# Patient Record
Sex: MALE | Race: WHITE | URBAN - METROPOLITAN AREA
[De-identification: names, ages, dates, MRNs, and addresses within clinical notes are randomized per-mention and may not be internally consistent; named-entity substitution may affect disease eponyms.]

---

## 2017-09-12 ENCOUNTER — HOSPITAL ENCOUNTER (OUTPATIENT)
Dept: MEDSURG UNIT | Facility: HOSPITAL | Age: 32
End: 2017-09-13
Attending: INTERNAL MEDICINE | Admitting: INTERNAL MEDICINE

## 2017-09-12 LAB
ABS NEUT (OLG): 8.3 X10(3)/MCL (ref 1.5–6.9)
ALBUMIN SERPL-MCNC: 3.9 GM/DL (ref 3.4–5)
ALBUMIN/GLOB SERPL: 1.1 RATIO
ALP SERPL-CCNC: 61 UNIT/L (ref 30–113)
ALT SERPL-CCNC: 32 UNIT/L (ref 10–45)
AMPHET UR QL SCN: NORMAL
APPEARANCE, UA: CLEAR
APTT PPP: 35.2 SECOND(S) (ref 25–35)
AST SERPL-CCNC: 25 UNIT/L (ref 15–37)
BARBITURATE SCN PRESENT UR: NORMAL
BASOPHILS # BLD AUTO: 0 X10(3)/MCL (ref 0–0.1)
BASOPHILS NFR BLD AUTO: 0 % (ref 0–1)
BENZODIAZ UR QL SCN: NORMAL
BILIRUB SERPL-MCNC: 0.2 MG/DL (ref 0.1–0.9)
BILIRUB UR QL STRIP: NEGATIVE
BILIRUBIN DIRECT+TOT PNL SERPL-MCNC: 0.1 MG/DL
BILIRUBIN DIRECT+TOT PNL SERPL-MCNC: 0.1 MG/DL (ref 0–0.3)
BUN SERPL-MCNC: 15 MG/DL (ref 10–20)
CALCIUM SERPL-MCNC: 9 MG/DL (ref 8–10.5)
CANNABINOIDS UR QL SCN: NORMAL
CHLORIDE SERPL-SCNC: 106 MMOL/L (ref 100–108)
CK MB SERPL-MCNC: 1.5 NG/ML (ref 0–3.6)
CK SERPL-CCNC: 200 UNIT/L (ref 39–225)
CO2 SERPL-SCNC: 25 MMOL/L (ref 21–35)
COCAINE UR QL SCN: NORMAL
COLOR UR: NORMAL
CREAT SERPL-MCNC: 1.13 MG/DL (ref 0.7–1.3)
EOSINOPHIL # BLD AUTO: 0.2 X10(3)/MCL (ref 0–0.6)
EOSINOPHIL NFR BLD AUTO: 1 % (ref 0–5)
ERYTHROCYTE [DISTWIDTH] IN BLOOD BY AUTOMATED COUNT: 12.6 % (ref 11.5–17)
GLOBULIN SER-MCNC: 3.7 GM/DL
GLUCOSE (UA): NEGATIVE
GLUCOSE SERPL-MCNC: 90 MG/DL (ref 75–116)
HCT VFR BLD AUTO: 42.3 % (ref 42–52)
HGB BLD-MCNC: 14.5 GM/DL (ref 14–18)
HGB UR QL STRIP: NEGATIVE
INR PPP: 1 (ref 0–1.2)
KETONES UR QL STRIP: NEGATIVE
LEUKOCYTE ESTERASE UR QL STRIP: NEGATIVE
LYMPHOCYTES # BLD AUTO: 4.3 X10(3)/MCL (ref 0.5–4.1)
LYMPHOCYTES NFR BLD AUTO: 30.8 % (ref 15–40)
MCH RBC QN AUTO: 33 PG (ref 27–34)
MCHC RBC AUTO-ENTMCNC: 34 GM/DL (ref 31–36)
MCV RBC AUTO: 96 FL (ref 80–99)
MDMA UR QL SCN: NORMAL
METHADONE UR QL SCN: NORMAL
MONOCYTES # BLD AUTO: 1 X10(3)/MCL (ref 0–1.1)
MONOCYTES NFR BLD AUTO: 7 % (ref 4–12)
NEUTROPHILS # BLD AUTO: 8.3 X10(3)/MCL (ref 1.5–6.9)
NEUTROPHILS NFR BLD AUTO: 60 % (ref 43–75)
NITRITE UR QL STRIP: NEGATIVE
OPIATES UR QL SCN: NORMAL
PCP UR QL: NORMAL
PH UR STRIP.AUTO: 5.5 [PH] (ref 5–8)
PH UR STRIP: 5.5 [PH]
PLATELET # BLD AUTO: 261 X10(3)/MCL (ref 140–400)
PMV BLD AUTO: 8.8 FL (ref 6.8–10)
POTASSIUM SERPL-SCNC: 3.8 MMOL/L (ref 3.6–5.2)
PROT SERPL-MCNC: 7.6 GM/DL (ref 6.4–8.2)
PROT UR QL STRIP: NEGATIVE
PROTHROMBIN TIME: 11.4 SECOND(S) (ref 9–12)
RBC # BLD AUTO: 4.39 X10(6)/MCL (ref 4.7–6.1)
SODIUM SERPL-SCNC: 139 MMOL/L (ref 135–145)
SP GR UR STRIP: 1.02
T4 FREE SERPL-MCNC: 1.04 NG/DL (ref 0.76–1.46)
TEMPERATURE, URINE (OHS): 22.5 DEGC (ref 20–25)
TROPONIN I SERPL-MCNC: <0.017 NG/ML (ref 0–0.06)
TSH SERPL-ACNC: 3.71 MIU/ML (ref 0.36–3.74)
UROBILINOGEN UR STRIP-ACNC: 0.2 EU/DL
WBC # SPEC AUTO: 13.9 X10(3)/MCL (ref 4.5–11.5)

## 2017-09-13 LAB
ABS NEUT (OLG): 5 X10(3)/MCL (ref 1.5–6.9)
ALBUMIN SERPL-MCNC: 3.2 GM/DL (ref 3.4–5)
ALBUMIN/GLOB SERPL: 0.9 RATIO
ALP SERPL-CCNC: 55 UNIT/L (ref 30–113)
ALT SERPL-CCNC: 28 UNIT/L (ref 10–45)
AST SERPL-CCNC: 22 UNIT/L (ref 15–37)
BASOPHILS # BLD AUTO: 0 X10(3)/MCL (ref 0–0.1)
BASOPHILS NFR BLD AUTO: 0 % (ref 0–1)
BILIRUB SERPL-MCNC: 0.2 MG/DL (ref 0.1–0.9)
BILIRUBIN DIRECT+TOT PNL SERPL-MCNC: 0.1 MG/DL
BILIRUBIN DIRECT+TOT PNL SERPL-MCNC: 0.1 MG/DL (ref 0–0.3)
BUN SERPL-MCNC: 12 MG/DL (ref 10–20)
CALCIUM SERPL-MCNC: 8.3 MG/DL (ref 8–10.5)
CHLORIDE SERPL-SCNC: 109 MMOL/L (ref 100–108)
CK MB SERPL-MCNC: 1 NG/ML (ref 0–3.6)
CK MB SERPL-MCNC: 1.3 NG/ML (ref 0–3.6)
CK SERPL-CCNC: 145 UNIT/L (ref 39–225)
CK SERPL-CCNC: 164 UNIT/L (ref 39–225)
CO2 SERPL-SCNC: 28 MMOL/L (ref 21–35)
CREAT SERPL-MCNC: 1.05 MG/DL (ref 0.7–1.3)
EOSINOPHIL # BLD AUTO: 0.3 X10(3)/MCL (ref 0–0.6)
EOSINOPHIL NFR BLD AUTO: 3 % (ref 0–5)
ERYTHROCYTE [DISTWIDTH] IN BLOOD BY AUTOMATED COUNT: 12.8 % (ref 11.5–17)
GLOBULIN SER-MCNC: 3.4 GM/DL
GLUCOSE SERPL-MCNC: 107 MG/DL (ref 75–116)
HCT VFR BLD AUTO: 41.3 % (ref 42–52)
HGB BLD-MCNC: 14.1 GM/DL (ref 14–18)
LYMPHOCYTES # BLD AUTO: 3.8 X10(3)/MCL (ref 0.5–4.1)
LYMPHOCYTES NFR BLD AUTO: 38.1 % (ref 15–40)
MCH RBC QN AUTO: 33 PG (ref 27–34)
MCHC RBC AUTO-ENTMCNC: 34 GM/DL (ref 31–36)
MCV RBC AUTO: 97 FL (ref 80–99)
MONOCYTES # BLD AUTO: 0.8 X10(3)/MCL (ref 0–1.1)
MONOCYTES NFR BLD AUTO: 8 % (ref 4–12)
NEUTROPHILS # BLD AUTO: 5 X10(3)/MCL (ref 1.5–6.9)
NEUTROPHILS NFR BLD AUTO: 50 % (ref 43–75)
PLATELET # BLD AUTO: 228 X10(3)/MCL (ref 140–400)
PMV BLD AUTO: 8.7 FL (ref 6.8–10)
POTASSIUM SERPL-SCNC: 4.2 MMOL/L (ref 3.6–5.2)
PROT SERPL-MCNC: 6.6 GM/DL (ref 6.4–8.2)
RBC # BLD AUTO: 4.26 X10(6)/MCL (ref 4.7–6.1)
SODIUM SERPL-SCNC: 144 MMOL/L (ref 135–145)
TROPONIN I SERPL-MCNC: <0.017 NG/ML (ref 0–0.06)
TROPONIN I SERPL-MCNC: <0.017 NG/ML (ref 0–0.06)
WBC # SPEC AUTO: 9.9 X10(3)/MCL (ref 4.5–11.5)

## 2022-04-30 NOTE — ED PROVIDER NOTES
Patient:   Antonio Frost             MRN: [id number]            FIN: [id number]               Age:   31 years     Sex:  Male     :  1985   Associated Diagnoses:   Palpitations; Atypical chest pain   Author:   Ml Munson MD      Basic Information   Time seen: Date & time 2017 17:30:00.   History source: Patient.   Arrival mode: Private vehicle.   History limitation: None.   Additional information: Chief Complaint from Nursing Triage Note : Chief Complaint   2017 17:24 CDT      Chief Complaint           Reports chest pain after episode of palpitations, PTA.  .      History of Present Illness   The patient presents with chest pain and Palpitations.  The onset was just prior to arrival.  The course/duration of symptoms is resolved: lasted 2 minute(s).  Location: substernal. Radiating pain: none. The character of symptoms is tightness.  The degree at onset was moderate.  The degree at maximum was moderate.  The degree at present is none.  The exacerbating factor is none.  The relieving factor is none.  Risk factors consist of family history of coronary artery disease.  Prior episodes: Palpitations in past.  Therapy today None.  Associated symptoms: palpitations.    According to patient he felt as if his heart is a stopped and it became tied behind his sternum, and he fell on his knees holding his chest and he punched himself and that his heart started again and he had chest pain which is getting better now..        Review of Systems   Constitutional symptoms:  No fever, no chills, no sweats.    Skin symptoms:  No jaundice, no rash.    Eye symptoms:  Negative except as documented in HPI.   ENMT symptoms:  No sore throat,    Respiratory symptoms:  No shortness of breath, no cough, no wheezing.    Cardiovascular symptoms:  Chest pain, central, moderate pain, tightness, palpitations, No tachycardia,    Gastrointestinal symptoms:  No abdominal pain, no nausea, no vomiting, no diarrhea, no  constipation.    Genitourinary symptoms:  No dysuria,    Musculoskeletal symptoms:  No back pain,    Neurologic symptoms:  No headache, no dizziness.    Psychiatric symptoms:  No anxiety, no depression, no substance abuse.    Allergy/immunologic symptoms:  No seasonal allergies,              Additional review of systems information: All other systems reviewed and otherwise negative.      Health Status   Allergies:    Allergic Reactions (Selected)  No Known Medication Allergies.   Medications: Per nurse's notes.      Past Medical/ Family/ Social History   Medical history: Negative.   Surgical history:    Herniorrhaphy ([id number]).  Cholecystectomy ([id number])., Reviewed as documented in chart.   Family history:    Father:  ()  Cause of Death: AMI  Age at Death: 46 years.   Acute myocardial infarction.  Mother  Coronary artery disease  Hypertension.  Diabetes mellitus type 2  Hyperlipidemia.  , Reviewed as documented in chart.   Social history:    Social & Psychosocial Habits    Alcohol  2017  Use: Current    Type: Beer    Frequency: 1-2 times per month    Tobacco  2017  Use: Heavy tobacco smoker    Type: Cigarettes    Tobacco use per day: 20  , Reviewed as documented in chart, Alcohol use: Occasionally, Tobacco use: Regularly, Drug use: Denies, Occupation: Employed, Family/social situation: .   Problem list:    Active Problems (1)  Tobacco user   , per nurse's notes.      Physical Examination               Vital Signs   Vital Signs   2017 17:24 CDT      Temperature Temporal Artery               36.8 DegC                             Peripheral Pulse Rate     101 bpm  HI                             Respiratory Rate          20 br/min                             SpO2                      98 %                             Oxygen Therapy            Room air                             Systolic Blood Pressure   153 mmHg  HI                             Diastolic Blood Pressure  103 mmHg  HI  .    Measurements   2017 17:24 CDT      Weight Dosing             125 kg                             Weight Measured and Calculated in Lbs     275.57 lb                             Weight Estimated          125 kg                             Height/Length Dosing      172 cm                             Height/Length Estimated   172 cm                             Body Mass Index Estimated 42.25 kg/m2  .   Oxygen saturation.   General:  Alert, no acute distress.    Skin:  Warm, dry.    Head:  Normocephalic.   Neck:  Supple.   Eye:  Extraocular movements are intact, No icterus.    Ears, nose, mouth and throat:  Oral mucosa moist, no pharyngeal erythema or exudate.    Cardiovascular:  Regular rate and rhythm, No murmur, Normal peripheral perfusion, No edema.    Respiratory:  Lungs are clear to auscultation, respirations are non-labored, breath sounds are equal.    Back:  Nontender, Normal range of motion.    Musculoskeletal:  Normal ROM.   Chest wall   Gastrointestinal:  Soft, Nontender, Non distended, Normal bowel sounds, No organomegaly.    Neurological:  Alert and oriented to person, place, time, and situation, No focal neurological deficit observed, CN II-XII intact, normal motor observed, normal speech observed.    Lymphatics   Psychiatric:  Cooperative, appropriate mood & affect.       Medical Decision Making   Differential Diagnosis:  Angina, Cardiac dysrhythmia.    Documents reviewed:  Emergency department nurses' notes.   Orders  Launch Order Profile (Selected)   Inpatient Orders  Ordered  Blood Glucose Monitoring POC: 17 17:26:00 CDT, Once-Unscheduled, 17 17:26:00 CDT  Blood Pressure: 17 17:27:00 CDT, Stop date 17 17:27:00 CDT, Monitor blood pressure.  Cardiac Monitorin17 17:27:00 CDT, Constant Order, Place on telemetry.  EKG Adult 12 Lead: 17 17:27:00 CDT, Stat, Chest Pain, 17 17:27:00 CDT, Show to provider upon completion., -1, -1, 17 17:27:00 CDT  Normal  Saline (0.9% NS) IV 1,000 mL: 1,000 mL, 1,000 mL, IV, 150 mL/hr, start date 09/12/17 17:37:00 CDT  Pulse Oximetry: 09/12/17 17:27:00 CDT, Stop date 09/12/17 17:27:00 CDT, Place on pulse oximetry.  Monitor oxygen saturation.  Saline Lock Insert: 09/12/17 17:37:00 CDT, Once-NOW, Stop date 09/12/17 17:37:00 CDT  aspirin 325 mg oral tablet: 325 mg, form: Tab, Oral, Once-NOW, first dose 09/12/17 17:37:00 CDT, stop date 09/12/17 17:37:00 CDT, 4 chew tab = 5 grain ASA, [id number]  Ordered (Exam Ordered)  XR Chest 1 View: Stat, 09/12/17 17:27:00 CDT, Hypertension, None, Stretcher, Rad Type, 09/12/17 17:27:00 CDT  Ordered (In-Lab)  BNP-Pro: Stat collect, 09/12/17 17:27:00 CDT, Blood, Once, Stop date 09/12/17 17:27:00 CDT, Lab Collect, 09/12/17 17:27:00 CDT  CK MB: Stat collect, 09/12/17 17:27:00 CDT, Blood, Stop date 09/12/17 17:27:00 CDT, Lab Collect, Print Label By Order Location, 09/12/17 17:27:00 CDT  CMP: Stat collect, 09/12/17 17:27:00 CDT, Blood, Stop date 09/12/17 17:27:00 CDT, Lab Collect, 09/12/17 17:27:00 CDT  CPK: Stat collect, 09/12/17 17:27:00 CDT, Blood, Stop date 09/12/17 17:27:00 CDT, Lab Collect, Print Label By Order Location, 09/12/17 17:27:00 CDT  PT (Protime): Stat collect, 09/12/17 17:27:00 CDT, Blood, Stop date 09/12/17 17:27:00 CDT, Lab Collect, 09/12/17 17:27:00 CDT  PTT: Stat collect, 09/12/17 17:27:00 CDT, Blood, Stop date 09/12/17 17:27:00 CDT, Lab Collect, Print Label By Order Location, 09/12/17 17:27:00 CDT  Troponin-I: Stat collect, 09/12/17 17:27:00 CDT, Blood, Stop date 09/12/17 17:27:00 CDT, Lab Collect, 09/12/17 17:27:00 CDT  Completed  Automated Diff: Stat collect, 09/12/17 17:34:00 CDT, Blood, Collected, Stop date 09/12/17 17:34:00 CDT, Lab Collect, 09/12/17 17:27:00 CDT  CBC w/ Auto Diff: Stat collect, 09/12/17 17:27:00 CDT, Blood, Stop date 09/12/17 17:27:00 CDT, Lab Collect, 09/12/17 17:27:00 CDT  POC CBG: Blood, Routine collect, Collected, 09/12/17 17:23:00 CDT  Discontinued  EKG:  09/12/17 17:26:00 CDT, Stat, Once, 24, 09/12/17 17:26:00 CDT, -1, -1, 09/12/17 17:26:00 CDT.   Results review:  Lab results : Lab View   9/12/2017 17:34 CDT      Sodium Lvl                139 mmol/L                             Potassium Lvl             3.8 mmol/L                             Chloride                  106 mmol/L                             CO2                       25 mmol/L                             Calcium Lvl               9.0 mg/dL                             Glucose Lvl               90 mg/dL                             BUN                       15 mg/dL                             Creatinine                1.13 mg/dL                             eGFR-AA                   >60 mL/min/1.73 m2  NA                             eGFR-AMY                  >60 mL/min/1.73 m2  NA                             Bili Total                0.2 mg/dL                             Bili Direct               0.10 mg/dL                             Bili Indirect             0.10 mg/dL  NA                             AST                       25 unit/L                             ALT                       32 unit/L                             Alk Phos                  61 unit/L                             Total Protein             7.6 gm/dL                             Albumin Lvl               3.9 gm/dL                             Globulin                  3.70 gm/dL  NA                             A/G Ratio                 1.1 ratio  NA                             pro BNP.                  23 pg/mL                             Total CK                  200 unit/L                             CK MB                     1.5 ng/mL                             Troponin-I                <0.017 ng/mL                             PT                        11.4 second(s)                             INR                       1.0                             PTT                       35.2 second(s)  HI                             WBC                        13.9 x10(3)/mcL  HI                             RBC                       4.39 x10(6)/mcL  LOW                             Hgb                       14.5 gm/dL                             Hct                       42.3 %                             Platelet                  261 x10(3)/mcL                             MCV                       96 fL                             MCH                       33 pg                             MCHC                      34 gm/dL                             RDW                       12.6 %                             MPV                       8.8 fL                             Abs Neut                  8.3 x10(3)/mcL  HI                             Neutro Auto               60 %                             Lymph Auto                30.80 %                             Mono Auto                 7 %                             Eos Auto                  1 %                             Abs Eos                   0.2 x10(3)/mcL                             Basophil Auto             0 %                             Abs Neutro                8.3 x10(3)/mcL  HI                             Abs Lymph                 4.3 x10(3)/mcL  HI                             Abs Mono                  1.0 x10(3)/mcL                             Abs Baso                  0.0 x10(3)/mcL    ,    No qualifying data available.    Radiology results:  09/12/2017 17:48; Je[facility]y [facility], [facility]; Negative; Chest 1 view: No infiltrate, mass or other acute cardiopulmonary abnormality.      Reexamination/ Reevaluation   Time: 9/12/2017 18:40:00 .   Assessment: exam improved.      Impression and Plan   Diagnosis   Palpitations ([id number]-WL R00.2)   Atypical chest pain ([id number]-JB R07.89)   Plan   Disposition: Place in Observation Unit, Alyssa [facility], Naveen SALGADO, Patient care transitioned to: Time: 9/12/2017 18:00:00, Mallika [facility], Chris WILHELM    Orders: Launch Orders   Admit/Transfer/Discharge:  Admit to Outpatient  Observation (Order): 9/12/2017 20:05 CDT, Alyssa [facility], Naveen SALGADO Telemetry with Monitor, No  .       Addendum   Patient is a 31-year-old with  a history of CAD presents to the emergency room with chest pain since 5:00.  Patient stated that he had a corndog and  a cream soda for lunch , patient states that about 17pm he felt dizzy while standing he feels if his heart stopped and developed some chest pain patient came to the emergency room patient denies any  diabetes hypertension and hypercholesterolemia has not had a regular physical.  pt states that 3 weeks ago he had walking pneumonia to some antibiotics and some mild neb treatments.   Patient states that it is a little bit gassy.  Denies any chest pain at the moment shortness of breath.  She notes significant family history also of CAD father mi in the 500s , and mother stent   On examination normocephaly is atraumatic cardiovascular S1-S2 no murmurs abdomen soft nontender respiratory clear to auscultation bilaterally extremities no edema

## 2022-04-30 NOTE — H&P
Patient:   Antonio Frost             MRN: [id number]            FIN: [id number]               Age:   31 years     Sex:  Male     :  1985   Associated Diagnoses:   UC - Chest Pain or Tightness; Palpitations   Author:   Minerva TORRES MD, Ok Menon      Basic Information   Source of history:  Self, Spouse.    Present at bedside:  Family member, Medical personnel.    Referral source:  Emergency department.    History limitation:  None.       Chief Complaint   2017 17:24 CDT      Reports chest pain after episode of palpitations, PTA.   I was standing around the shop after work and had 10/10 chest pressure, SOB and dizziness.      History of Present Illness   This is a telehospitalist encounter and permission has been obtained for this interview. Pt allowed his wife to remain in the room during the interview.    I had a pulling pain under my breast bone that made me short of breath and dizzy. It felt that I might die. There was a fluttering in my heart. All of my mother and fathers people have heart disease. Pt smokes at least 1 pack per day. He denies any previous Hx of chest pain or use of drugs. 3 weeks ago the patient had walking pneumonia and was given doxycycline/prednisone which he took. His cough is much better and he has no painful respiration.      Review of Systems   Constitutional:  Fatigue, Decreased activity, No fever, No chills.    Eye:  Negative.    Ear/Nose/Mouth/Throat:  Negative.    Respiratory:  No shortness of breath.    Cardiovascular:  No palpitations, No syncope     Chest pain: Midsternal.    Gastrointestinal:  No nausea, No vomiting, No diarrhea, No constipation, No heartburn, No abdominal pain, No hematemesis.    Genitourinary:  No dysuria, No hematuria, No change in urine stream, No urethral discharge, No lesions.    Hematology/Lymphatics:  No bruising tendency, No bleeding tendency, No swollen lymph glands.    Endocrine:  No excessive thirst, No polyuria, No cold  intolerance, No heat intolerance.    Immunologic:  Negative except as documented in history of present illness.    Musculoskeletal:  No back pain, No neck pain, No joint pain, No muscle pain, No claudication, No trauma.    Integumentary:  No other significant skin complaints, No rash, No breakdown.    Neurologic:  Alert and oriented X4, No abnormal balance, No confusion, No numbness.    Psychiatric:  No depression, No maria esther, Not suicidal, No hallucinations.    ROS reviewed as documented in chart      Health Status   Allergies:    Allergic Reactions (All)  No Known Medication Allergies   Current medications:    Medications (9) Active  Scheduled: (4)  albuterol-ipratropium 3mg-0.5 mg/3 mL Sol  3 mL, NEB, q6hr  aspirin 325 mg Tab  325 mg 1 tab(s), Oral, Daily  heparin 5000 units/ml Inj-1ml  5,000 units 1 mL, Subcutaneous, q12hr  pantoprazole 40 mg Inj  40 mg 1 EA, IV Slow, Daily  Continuous: (2)  sodium chloride 0.9% 1,000 mL  1,000 mL, IV, 150 mL/hr  sodium chloride 0.9% 1,000 mL  1,000 mL, IV, 125 mL/hr  PRN: (3)  acetaminophen 500 mg Tab  1,000 mg 2 tab(s), Oral, q6hr  magnesium hydroxide 8% Ashley (MOM) UD  30 mL, Oral, Once a day (at bedtime)  ondansetron 2 mg/mL inj - 2mL  4 mg 2 mL, IV Push, q4hr     Problem list:    Active Problems (1)  Tobacco user         Histories   Past Medical History:    No active or resolved past medical history items have been selected or recorded.   Family History:    Diabetes mellitus type 2  Mother  Coronary artery disease  Mother  Acute myocardial infarction.  Father ()  Hypertension.  Mother  Hyperlipidemia.  Mother     Procedure history:    Herniorrhaphy ([id number]).  Cholecystectomy ([id number]).   Social History        Social & Psychosocial Habits    Alcohol  2017  Use: Current    Type: Beer    Frequency: 1-2 times per month    Tobacco  2017  Use: Heavy tobacco smoker    Type: Cigarettes    Tobacco use per day: 20  .        Physical Examination   General:  Alert  and oriented, No acute distress.    Eye:  Pupils are equal, round and reactive to light, Extraocular movements are intact, Normal conjunctiva, Vision unchanged.    HENT:  Normocephalic, Tympanic membranes are clear, Normal hearing, Oral mucosa is moist.    Neck:  Supple, Non-tender, No carotid bruit, No jugular venous distention, No lymphadenopathy.    Respiratory:  Lungs are clear to auscultation, Breath sounds are equal, No chest wall tenderness.    Cardiovascular:  Normal rate, No murmur, Good pulses equal in all extremities, Normal peripheral perfusion, No edema, S 4 S 1 S 2.    Abdomen : Bs+ nontender      Vital Signs (last 24 hrs)_____  Last Charted___________  Heart Rate Peripheral   86 bpm  (SEP 12 20:40)  Resp Rate         20 br/min  (SEP 12 19:06)  SBP      H 151mmHg  (SEP 12 20:40)  DBP      83 mmHg  (SEP 12 20:40)  SpO2      97 %  (SEP 12 21:14)     Measurements from flowsheet : Measurements   9/12/2017 20:30 CDT      Weight Dosing             125 kg                             Weight Measured and Calculated in Lbs     275.57 lb                             Weight Measured and Calculated in Lbs     275.57 lb                             Height/Length Dosing      172 cm    9/12/2017 17:24 CDT      Weight Dosing             125 kg                             Weight Measured and Calculated in Lbs     275.57 lb                             Weight Estimated          125 kg                             Height/Length Dosing      172 cm                             Height/Length Estimated   172 cm                             Body Mass Index Estimated 42.25 kg/m2     Genitourinary:  No costovertebral angle tenderness.    Lymphatics:  No lymphadenopathy neck, axilla, groin.    Musculoskeletal:  Normal range of motion, Normal strength, No tenderness, No deformity.    Integumentary:  Warm, Dry, Intact, No rash.    Neurologic:  Alert, Oriented, Normal sensory, Normal motor function, No focal deficits, Cranial Nerves  II-XII are grossly intact, Normal deep tendon reflexes.    Cognition and Speech:  Oriented, Speech clear and coherent, Functional cognition intact.    Psychiatric:  Cooperative, Appropriate mood & affect, Normal judgment, Non-suicidal.       Health Maintenance      Health Maintenance     Pending (in the next year)        Due            Alcohol Misuse Screening due  09/12/17  and every 1  year(s)           Body Mass Index Check due  09/12/17  and every 1  year(s)           Depression Screening due  09/12/17  and every 1  year(s)           HIV Screening due  09/12/17  and every 1  year(s)           Obesity Screening due  09/12/17  and every 1  year(s)           Tetanus Vaccine due  09/12/17  and every 10  year(s)        Due In Future            Influenza Vaccine not due until  10/02/17  and every 1  year(s)     Satisfied (in the past 1 year)        Satisfied            Blood Pressure Screening on  09/12/17.  Satisfied by Branden Hoffman CNA           Tobacco Use Screening on  09/12/17.  Satisfied by Kam MALIK, Nelson YOUNGER          Review / Management   Results review:     Labs (Last four charted values)  Glucose              90 (SEP 12) 90 (SEP 12)   PT                   11.4 (SEP 12) 11.4 (SEP 12)   INR                  1.0 (SEP 12) 1.0 (SEP 12)   PTT                  H 35.2 (SEP 12) .       Impression and Plan   Diagnosis     UC - Chest Pain or Tightness (PNED [id number]).     Course:  Improving, A strong family Hx of CAD.    Orders     O2, asa, serial cardiac enzymes, echocardiogram.     Diagnosis     Palpitations ([id number]-BW R00.2).     Course:  Improving.    Orders     Telemetry, EKG at 6AM  .     Diagnosis  Tobacco Use  Coarse chronic   Education and Follow-up:       Counseled: Patient, Family, Regarding diagnosis, Regarding treatment, Regarding medications.       Professional Services   This encounter took 50 minutes to complete.      Quality Measures   Heparin was initiated by the ER  as DVT prophylaxis.

## 2022-04-30 NOTE — DISCHARGE SUMMARY
Patient:   Antonio Frost             MRN: [id number]            FIN: [id number]               Age:   31 years     Sex:  Male     :  1985   Associated Diagnoses:   Atypical chest pain; Palpitations; UC - Chest Pain or Tightness   Author:   Farnaz Quinones MD, Eric      Results Review   General results   Most recent results   Discrete results only   2017 5:55 CDT       WBC                       9.9 x10(3)/mcL                             RBC                       4.26 x10(6)/mcL  LOW                             Hgb                       14.1 gm/dL                             Hct                       41.3 %  LOW                             Platelet                  228 x10(3)/mcL                             MCV                       97 fL                             MCH                       33 pg                             MCHC                      34 gm/dL                             RDW                       12.8 %                             MPV                       8.7 fL                             Abs Neut                  5.0 x10(3)/mcL                             Neutro Auto               50 %                             Lymph Auto                38.10 %                             Mono Auto                 8 %                             Eos Auto                  3 %                             Abs Eos                   0.3 x10(3)/mcL                             Basophil Auto             0 %                             Abs Neutro                5.0 x10(3)/mcL                             Abs Lymph                 3.8 x10(3)/mcL                             Abs Mono                  0.8 x10(3)/mcL                             Abs Baso                  0.0 x10(3)/mcL                             Sodium Lvl                144 mmol/L                             Potassium Lvl             4.2 mmol/L                             Chloride                  109 mmol/L  HI                              CO2                       28 mmol/L                             Calcium Lvl               8.3 mg/dL                             Glucose Lvl               107 mg/dL                             BUN                       12 mg/dL                             Creatinine                1.05 mg/dL                             eGFR-AA                   >60 mL/min/1.73 m2  NA                             eGFR-AMY                  >60 mL/min/1.73 m2  NA                             Bili Total                0.2 mg/dL                             Bili Direct               0.10 mg/dL                             Bili Indirect             0.10 mg/dL  NA                             AST                       22 unit/L                             ALT                       28 unit/L                             Alk Phos                  55 unit/L                             Total Protein             6.6 gm/dL                             Albumin Lvl               3.2 gm/dL  LOW                             Globulin                  3.40 gm/dL  NA                             A/G Ratio                 0.9 ratio  NA                             Total CK                  145 unit/L                             CK MB                     1.0 ng/mL                             Troponin-I                <0.017 ng/mL    9/13/2017 0:13 CDT       Total CK                  164 unit/L                             CK MB                     1.3 ng/mL                             Troponin-I                <0.017 ng/mL           Discharge Information   Discharge Summary Information:  Admitted  9/12/2017, Discharged  9/13/2017.         Admitting physician: Minerva TORRES MD, [place].         Consulting physician: Porfirio Martinez MD.         Discharge diagnosis: Atypical chest pain (XBP52-SX R07.89), Palpitations (YCC56-WS R00.2), UC - Chest Pain or Tightness (PNED [id number]).       Physical Examination   General:  Alert and oriented, No  acute distress.    Eye:  Pupils are equal, round and reactive to light, Extraocular movements are intact, Normal conjunctiva.    HENT:  Normocephalic, Tympanic membranes are clear.    Neck:  Supple, Non-tender, No carotid bruit, No jugular venous distention, No lymphadenopathy, No thyromegaly.    Respiratory:  Lungs are clear to auscultation, Breath sounds are equal, Symmetrical chest wall expansion, No chest wall tenderness.    Cardiovascular:  Normal rate, Regular rhythm, No murmur, No gallop, No edema.    Gastrointestinal:  Soft, Non-tender, Non-distended, Normal bowel sounds, No organomegaly.    Genitourinary:  No costovertebral angle tenderness.    Vital Signs   9/13/2017 9:13 CDT       SpO2                      96 %                             Oxygen Therapy            Room air    9/13/2017 8:00 CDT       Temperature Oral          36.5 DegC                             Heart Rate Monitored      69 bpm                             Respiratory Rate          18 br/min                             SpO2                      97 %                             Oxygen Therapy            Room air                             Systolic Blood Pressure   132 mmHg                             Diastolic Blood Pressure  79 mmHg    9/13/2017 1:32 CDT       SpO2                      98 %                             Oxygen Therapy            Nasal cannula                             Oxygen Flow Rate          2 L/min    9/12/2017 23:00 CDT      Temperature Temporal Artery               36.6 DegC                             Peripheral Pulse Rate     82 bpm                             Respiratory Rate          20 br/min                             SpO2                      98 %                             Systolic Blood Pressure   129 mmHg                             Diastolic Blood Pressure  81 mmHg    9/12/2017 21:14 CDT      SpO2                      97 %                             Oxygen Therapy            Nasal cannula                              Oxygen Flow Rate          2 L/min    9/12/2017 20:40 CDT      Temperature Temporal Artery               36.7 DegC                             Peripheral Pulse Rate     86 bpm                             SpO2                      95 %                             Systolic Blood Pressure   151 mmHg  HI                             Diastolic Blood Pressure  83 mmHg    9/12/2017 20:10 CDT      Temperature Temporal Artery               36.8 DegC                             Peripheral Pulse Rate     82 bpm                             SpO2                      97 %                             Oxygen Therapy            Room air                             Systolic Blood Pressure   146 mmHg  HI                             Diastolic Blood Pressure  97 mmHg  HI                             Mean Arterial Pressure, Cuff              113 mmHg    9/12/2017 19:06 CDT      Peripheral Pulse Rate     78 bpm                             Respiratory Rate          20 br/min                             SpO2                      98 %                             Oxygen Therapy            Room air                             Systolic Blood Pressure   129 mmHg                             Diastolic Blood Pressure  89 mmHg                             Mean Arterial Pressure, Cuff              102 mmHg    9/12/2017 17:55 CDT      Peripheral Pulse Rate     96 bpm                             Respiratory Rate          20 br/min                             SpO2                      98 %                             Oxygen Therapy            Room air                             Systolic Blood Pressure   143 mmHg  HI                             Diastolic Blood Pressure  88 mmHg                             Mean Arterial Pressure, Cuff              106 mmHg  (Modified)   9/12/2017 17:24 CDT      Temperature Temporal Artery               36.8 DegC                             Peripheral Pulse Rate     101 bpm  HI                              Respiratory Rate          20 br/min                             SpO2                      98 %                             Oxygen Therapy            Room air                             Systolic Blood Pressure   153 mmHg  HI                             Diastolic Blood Pressure  103 mmHg  HI        Vital Signs (last 24 hrs)_____  Last Charted___________  Temp Oral     36.5 DegC  (SEP 13 08:00)  Heart Rate Peripheral   82 bpm  (SEP 12 23:00)  Resp Rate         18 br/min  (SEP 13 08:00)  SBP      132 mmHg  (SEP 13 08:00)  DBP      79 mmHg  (SEP 13 08:00)  SpO2      96 %  (SEP 13 09:13)     Lymphatics:  No lymphadenopathy neck, axilla, groin.    Musculoskeletal:  Normal range of motion, Normal strength, No swelling.    Integumentary:  Warm.    Neurologic:  Alert, Oriented, Normal motor function, No focal deficits, Cranial Nerves II-XII are grossly intact, Normal deep tendon reflexes.    Psychiatric:  Cooperative, Non-suicidal.       Hospital Course   Hospital Course   Admitted from: from emergency department.     Transferred via: by car.     Admitting diagnosis: Atypical chest pain (DSC76-CM R07.89), Palpitations (COZ95-RS R00.2), UC - Chest Pain or Tightness (PNED [id number]).     Admission disposition: admit to medical bed.       HPI :  I had a pulling pain under my breast bone that made me short of breath and dizzy. It felt that I might die. There was a fluttering in my heart. All of my mother and fathers people have heart disease. Pt smokes at least 1 pack per day. He denies any previous Hx of chest pain or use of drugs. 3 weeks ago the patient had walking pneumonia and was given doxycycline/prednisone which he took. His cough is much better and he has no painful respiration.     Hospital course : 30 y/o wm admitted with a c/o chest pain  . Pt was admitted to observation . His cardiac enzymes reamin normal .   The chest pain resolved .  Cardiology was conuslted and agree with the current   management  and f/u outpt .  TTE was done and show a ef of 50 to 55 % . Pt was seen  and examined at [facility] . He was aao x 3 in nad and hemodynamically stable . he was determined to be suitbale  for d/c         D/C summary time : > 35 min       Discharge Plan   Discharge Summary Plan   Discharge Status: improved.     Discharge instructions given: to patient.     Discharge disposition: discharge to home (into the care of family member, self care).     Prescriptions: continue same medications, reviewed.     Education and Follow-up   Counseled: patient, family, regarding diagnosis, regarding treatment, regarding medications.     Discharge Planning: Scotty Thomas 9/29/2017 09:45:00 Follow up results of testing; Porfirio May 9/25/2017 11:00:00 Echocardiogram; [facility] [place] [facility] 9/20/2017 13:00:00 Stress test; No No PCP.